# Patient Record
Sex: MALE | Race: ASIAN | NOT HISPANIC OR LATINO | URBAN - METROPOLITAN AREA
[De-identification: names, ages, dates, MRNs, and addresses within clinical notes are randomized per-mention and may not be internally consistent; named-entity substitution may affect disease eponyms.]

---

## 2023-01-05 ENCOUNTER — OUTPATIENT (OUTPATIENT)
Dept: OUTPATIENT SERVICES | Facility: HOSPITAL | Age: 28
LOS: 1 days | End: 2023-01-05
Payer: COMMERCIAL

## 2023-01-05 VITALS
TEMPERATURE: 99 F | WEIGHT: 154.1 LBS | DIASTOLIC BLOOD PRESSURE: 79 MMHG | OXYGEN SATURATION: 100 % | SYSTOLIC BLOOD PRESSURE: 131 MMHG | HEIGHT: 68 IN | HEART RATE: 76 BPM | RESPIRATION RATE: 17 BRPM

## 2023-01-05 DIAGNOSIS — K40.30 UNILATERAL INGUINAL HERNIA, WITH OBSTRUCTION, WITHOUT GANGRENE, NOT SPECIFIED AS RECURRENT: ICD-10-CM

## 2023-01-05 DIAGNOSIS — K40.90 UNILATERAL INGUINAL HERNIA, WITHOUT OBSTRUCTION OR GANGRENE, NOT SPECIFIED AS RECURRENT: ICD-10-CM

## 2023-01-05 DIAGNOSIS — Z78.9 OTHER SPECIFIED HEALTH STATUS: Chronic | ICD-10-CM

## 2023-01-05 DIAGNOSIS — Z01.818 ENCOUNTER FOR OTHER PREPROCEDURAL EXAMINATION: ICD-10-CM

## 2023-01-05 PROCEDURE — G0463: CPT

## 2023-01-05 RX ORDER — SODIUM CHLORIDE 9 MG/ML
3 INJECTION INTRAMUSCULAR; INTRAVENOUS; SUBCUTANEOUS EVERY 8 HOURS
Refills: 0 | Status: DISCONTINUED | OUTPATIENT
Start: 2023-01-18 | End: 2023-02-01

## 2023-01-05 NOTE — H&P PST ADULT - NSICDXFAMILYHX_GEN_ALL_CORE_FT
FAMILY HISTORY:  Father  Still living? Yes, Estimated age: 55  Known health problems: none, Age at diagnosis: Age Unknown    Mother  Still living? Yes, Estimated age: 52  Known health problems: none, Age at diagnosis: Age Unknown

## 2023-01-05 NOTE — H&P PST ADULT - ASSESSMENT
27 yr old healthy male no medical issues or medications. Pt presents with unilateral inguinal hernia without obstruction/gangrene not recurrent. Pt felt a bulge about 2 months ago and noticed especially when coughing or urinating. Pt seen by PCP who referred him to surgeon. Pt scheduled for right inguinal hernia repair with mesh on 1/18/2023.

## 2023-01-05 NOTE — H&P PST ADULT - NSANTHOSAYNRD_GEN_A_CORE
No. YANIV screening performed.  STOP BANG Legend: 0-2 = LOW Risk; 3-4 = INTERMEDIATE Risk; 5-8 = HIGH Risk

## 2023-01-05 NOTE — H&P PST ADULT - NSICDXPROCEDURE_GEN_ALL_CORE_FT
PROCEDURES:  Repair, hernia, inguinal, reducible, age 5 years or older 05-Jan-2023 10:29:08  Mechelle Celeste

## 2023-01-05 NOTE — H&P PST ADULT - PROBLEM SELECTOR PLAN 1
scheduled for right inguinal hernia repair with mesh.    Pt instructed to be NPO the night before and the morning of surgery. Provided with chlorhexidine 4% solution to wash for 3 days including the morning of surgery. Written instructions given. Escort required post procedure. All questions answered concerning surgery.     Stop Bang Score=-1 Pt low risk for YANIV

## 2023-01-18 ENCOUNTER — OUTPATIENT (OUTPATIENT)
Dept: OUTPATIENT SERVICES | Facility: HOSPITAL | Age: 28
LOS: 1 days | End: 2023-01-18
Payer: COMMERCIAL

## 2023-01-18 VITALS
DIASTOLIC BLOOD PRESSURE: 86 MMHG | OXYGEN SATURATION: 100 % | TEMPERATURE: 98 F | HEART RATE: 78 BPM | SYSTOLIC BLOOD PRESSURE: 132 MMHG | RESPIRATION RATE: 18 BRPM

## 2023-01-18 VITALS
DIASTOLIC BLOOD PRESSURE: 73 MMHG | TEMPERATURE: 98 F | HEIGHT: 68 IN | WEIGHT: 154.1 LBS | OXYGEN SATURATION: 99 % | RESPIRATION RATE: 16 BRPM | SYSTOLIC BLOOD PRESSURE: 112 MMHG | HEART RATE: 73 BPM

## 2023-01-18 DIAGNOSIS — Z78.9 OTHER SPECIFIED HEALTH STATUS: Chronic | ICD-10-CM

## 2023-01-18 DIAGNOSIS — K40.90 UNILATERAL INGUINAL HERNIA, WITHOUT OBSTRUCTION OR GANGRENE, NOT SPECIFIED AS RECURRENT: ICD-10-CM

## 2023-01-18 LAB
ANION GAP SERPL CALC-SCNC: 7 MMOL/L — SIGNIFICANT CHANGE UP (ref 5–17)
BUN SERPL-MCNC: 10 MG/DL — SIGNIFICANT CHANGE UP (ref 7–18)
CALCIUM SERPL-MCNC: 9.6 MG/DL — SIGNIFICANT CHANGE UP (ref 8.4–10.5)
CHLORIDE SERPL-SCNC: 106 MMOL/L — SIGNIFICANT CHANGE UP (ref 96–108)
CO2 SERPL-SCNC: 27 MMOL/L — SIGNIFICANT CHANGE UP (ref 22–31)
CREAT SERPL-MCNC: 1.05 MG/DL — SIGNIFICANT CHANGE UP (ref 0.5–1.3)
EGFR: 100 ML/MIN/1.73M2 — SIGNIFICANT CHANGE UP
GLUCOSE SERPL-MCNC: 100 MG/DL — HIGH (ref 70–99)
HCT VFR BLD CALC: 40.2 % — SIGNIFICANT CHANGE UP (ref 39–50)
HGB BLD-MCNC: 14.2 G/DL — SIGNIFICANT CHANGE UP (ref 13–17)
MCHC RBC-ENTMCNC: 31.3 PG — SIGNIFICANT CHANGE UP (ref 27–34)
MCHC RBC-ENTMCNC: 35.3 GM/DL — SIGNIFICANT CHANGE UP (ref 32–36)
MCV RBC AUTO: 88.7 FL — SIGNIFICANT CHANGE UP (ref 80–100)
NRBC # BLD: 0 /100 WBCS — SIGNIFICANT CHANGE UP (ref 0–0)
PLATELET # BLD AUTO: 157 K/UL — SIGNIFICANT CHANGE UP (ref 150–400)
POTASSIUM SERPL-MCNC: 4.4 MMOL/L — SIGNIFICANT CHANGE UP (ref 3.5–5.3)
POTASSIUM SERPL-SCNC: 4.4 MMOL/L — SIGNIFICANT CHANGE UP (ref 3.5–5.3)
RBC # BLD: 4.53 M/UL — SIGNIFICANT CHANGE UP (ref 4.2–5.8)
RBC # FLD: 12.4 % — SIGNIFICANT CHANGE UP (ref 10.3–14.5)
SODIUM SERPL-SCNC: 140 MMOL/L — SIGNIFICANT CHANGE UP (ref 135–145)
WBC # BLD: 6.22 K/UL — SIGNIFICANT CHANGE UP (ref 3.8–10.5)
WBC # FLD AUTO: 6.22 K/UL — SIGNIFICANT CHANGE UP (ref 3.8–10.5)

## 2023-01-18 PROCEDURE — 49505 PRP I/HERN INIT REDUC >5 YR: CPT | Mod: RT

## 2023-01-18 PROCEDURE — 80048 BASIC METABOLIC PNL TOTAL CA: CPT

## 2023-01-18 PROCEDURE — 36415 COLL VENOUS BLD VENIPUNCTURE: CPT

## 2023-01-18 PROCEDURE — C1781: CPT

## 2023-01-18 PROCEDURE — 85027 COMPLETE CBC AUTOMATED: CPT

## 2023-01-18 DEVICE — MESH HERNIA PERFIX PLUG LARGE 1.6 X 1.90": Type: IMPLANTABLE DEVICE | Site: RIGHT | Status: FUNCTIONAL

## 2023-01-18 RX ORDER — ACETAMINOPHEN 500 MG
1000 TABLET ORAL ONCE
Refills: 0 | Status: COMPLETED | OUTPATIENT
Start: 2023-01-18 | End: 2023-01-18

## 2023-01-18 RX ORDER — OXYCODONE AND ACETAMINOPHEN 5; 325 MG/1; MG/1
1 TABLET ORAL
Qty: 20 | Refills: 0
Start: 2023-01-18 | End: 2023-01-22

## 2023-01-18 RX ORDER — FENTANYL CITRATE 50 UG/ML
50 INJECTION INTRAVENOUS
Refills: 0 | Status: DISCONTINUED | OUTPATIENT
Start: 2023-01-18 | End: 2023-01-19

## 2023-01-18 RX ORDER — OXYCODONE AND ACETAMINOPHEN 5; 325 MG/1; MG/1
1 TABLET ORAL EVERY 6 HOURS
Refills: 0 | Status: DISCONTINUED | OUTPATIENT
Start: 2023-01-18 | End: 2023-01-18

## 2023-01-18 RX ORDER — FENTANYL CITRATE 50 UG/ML
25 INJECTION INTRAVENOUS
Refills: 0 | Status: DISCONTINUED | OUTPATIENT
Start: 2023-01-18 | End: 2023-01-19

## 2023-01-18 RX ADMIN — Medication 1000 MILLIGRAM(S): at 19:27

## 2023-01-18 RX ADMIN — Medication 400 MILLIGRAM(S): at 19:00

## 2023-01-18 NOTE — BRIEF OPERATIVE NOTE - NSICDXBRIEFPROCEDURE_GEN_ALL_CORE_FT
PROCEDURES:  Repair, hernia, inguinal, open, using mesh, adult 18-Jan-2023 18:09:25  Angélica Terry

## 2023-01-18 NOTE — ASU PREOP CHECKLIST - WAS PATIENT ON BETA BLOCKER?
Physical Therapy Daily Progress Note             Subjective  Patient reports that the back doesn't feel too bad, rates the pain at 4/10.      Objective   See Exercise, Manual, and Modality Logs for complete treatment.       Assessment/Plan  Subjective reports are improved.  Patient reported that the KT helped, so it was continued today.  Patient tolerated the progression of core stabilization exercises very well, no reports of pain with the routine.                     Manual Therapy:    0     mins  61523;  Therapeutic Exercise:    27     mins  29183;     Neuromuscular Lori:    8    mins  34934;    Therapeutic Activity:     0     mins  05064;     Gait Trainin     mins  16405;     Ultrasound:     0     mins  47656;    Work Hardening           0      mins 66802  Iontophoresis               0   mins 61172    Timed Treatment:   35   mins   Total Treatment:     50   mins    Madhu Tee, PT  Physical Therapist    
No

## 2023-01-18 NOTE — ASU DISCHARGE PLAN (ADULT/PEDIATRIC) - NO HEAVY LIFTING DURATION
Avoid lifting more than 10 pounds for 6 weeks after your surgery or until you are cleared to do so by your doctor.

## 2023-01-18 NOTE — ASU DISCHARGE PLAN (ADULT/PEDIATRIC) - NS MD DC FALL RISK RISK
For information on Fall & Injury Prevention, visit: [email or web address] OR  [email or web address] OR  https://www.cdc.gov/steadi/patient.html

## 2023-01-18 NOTE — ASU DISCHARGE PLAN (ADULT/PEDIATRIC) - CARE PROVIDER_API CALL
Rfai Harvey)  ColonRectal Surgery; [place] [address]  Phone: [phone]  Fax: [phone]  Established Patient  Follow Up Time: 1 week

## 2023-01-18 NOTE — ASU DISCHARGE PLAN (ADULT/PEDIATRIC) - ASU DC SPECIAL INSTRUCTIONSFT
Your incision is closed with stitches and covered in white pieces of paper tape. You can take off the gauze and clear tape (the outer dressing) after 48 hours. After this only leave the paper tape (Steri strips) on unless they fall off by themselves.    You may shower one day after your surgery, however do not soak in tubs, pools, or other water.   Avoid heavy lifting for 6 weeks after your surgery.    To ice your incision, you can use an ice pack with cloth as a barrier. Ice your incision for 20 minutes on and 20 minutes off, maximum.     For pain, take Ibuprofen 600mg every 6 hours and you may alternate this with Tylenol 650 every 6 hours. For severe pain not controlled by these medications, you may take the Percocet (an opioid medication) prescribed to your pharmacy. Take one tablet every 6 hours as needed for severe pain. However each percocet contains 325mg Tylenol - do not take more than 4000 mg of Tylenol all together in one day  While taking percocet avoid driving or operating heavy machinery. Your incision is closed with stitches and covered in white pieces of paper tape. You can take off the gauze and clear tape (the outer dressing) after 48 hours. After this only leave the paper tape (Steri strips) on unless they fall off by themselves.    You may shower one day after your surgery, however do not soak in tubs, pools, or other water.   Avoid heavy lifting for 6 weeks after your surgery.    To ice your incision, you can use an ice pack with cloth as a barrier. Ice your incision for 20 minutes on and 20 minutes off, maximum.     For pain, take Ibuprofen 600mg every 6 hours and you may alternate this with Tylenol 650 every 6 hours. For severe pain not controlled by these medications, you may take the Percocet (an opioid medication) prescribed to your pharmacy. Take one tablet every 6 hours as needed for severe pain. However each percocet contains 325mg Tylenol - do not take more than 4000 mg of Tylenol all together in one day  While taking percocet avoid driving or operating heavy machinery.    contact your provider if you are having persistent nausea, vomiting, fevers, inability to keep food down or are unable to pass gas in the days following your surgery

## 2023-01-18 NOTE — ASU DISCHARGE PLAN (ADULT/PEDIATRIC) - DRIVING
Okay to drive as long as your pain is controlled. Please wait 24 hours after surgery to begin driving due to anesthesia. Do not drive if you are using opiate medication./Yes
